# Patient Record
Sex: MALE | Race: WHITE | NOT HISPANIC OR LATINO | Employment: OTHER | URBAN - METROPOLITAN AREA
[De-identification: names, ages, dates, MRNs, and addresses within clinical notes are randomized per-mention and may not be internally consistent; named-entity substitution may affect disease eponyms.]

---

## 2021-05-10 ENCOUNTER — PATIENT MESSAGE (OUTPATIENT)
Dept: RESEARCH | Facility: HOSPITAL | Age: 36
End: 2021-05-10

## 2023-07-18 ENCOUNTER — TELEPHONE (OUTPATIENT)
Dept: HEMATOLOGY/ONCOLOGY | Facility: CLINIC | Age: 38
End: 2023-07-18
Payer: COMMERCIAL

## 2023-07-18 NOTE — TELEPHONE ENCOUNTER
Called pt and discussed hem referral.  Pt stated he was recommended to see Dionisio and only wanted to laina w/ him.  Offered other provider with sooner appt date and pt declined.  Pt was sched with Dr Nichole's first avail [facility] hem, aug 21st; confirmed the appt date time and location.

## 2023-08-15 ENCOUNTER — TELEPHONE (OUTPATIENT)
Dept: HEMATOLOGY/ONCOLOGY | Facility: CLINIC | Age: 38
End: 2023-08-15
Payer: COMMERCIAL

## 2023-08-15 NOTE — TELEPHONE ENCOUNTER
Attempt to return pt call and LVM.     Pt scheduled w/ Dr Nichole on 8/21 1pm for benign hem.  Can be rs'd with any available provider, including NP's.      Called and spoke to pt.  Pt said he has to rs do to having a procedure on 8/21.  Offered pt other provider to rs with.   Pt request [facility] only, informed him Dr Nichole will be out the month of Sept.  Pt rs'd for Oct 2nd w/ Dr Nichole.

## 2023-08-15 NOTE — TELEPHONE ENCOUNTER
----- Message from Yoselin Magaña sent at 8/14/2023  5:37 PM CDT -----  Contact: Pt  Type:  Needs Medical Advice    Who Called: Pt  Would the patient rather a call back or a response via [facility]? call  Best Call Back Number: [phone]  Additional Information: Pt would like to reschedule his appt that he has with Dr. Nichole on Monday 08/21/2023. Please call pt back to advise.